# Patient Record
Sex: FEMALE | Race: ASIAN | NOT HISPANIC OR LATINO | Employment: FULL TIME | URBAN - METROPOLITAN AREA
[De-identification: names, ages, dates, MRNs, and addresses within clinical notes are randomized per-mention and may not be internally consistent; named-entity substitution may affect disease eponyms.]

---

## 2017-09-05 ENCOUNTER — OFFICE VISIT (OUTPATIENT)
Dept: INTERNAL MEDICINE | Facility: MEDICAL CENTER | Age: 47
End: 2017-09-05
Payer: COMMERCIAL

## 2017-09-05 VITALS
BODY MASS INDEX: 28.48 KG/M2 | DIASTOLIC BLOOD PRESSURE: 86 MMHG | HEIGHT: 59 IN | OXYGEN SATURATION: 98 % | SYSTOLIC BLOOD PRESSURE: 124 MMHG | WEIGHT: 141.25 LBS | TEMPERATURE: 97.9 F | HEART RATE: 72 BPM

## 2017-09-05 DIAGNOSIS — R63.5 WEIGHT GAIN: ICD-10-CM

## 2017-09-05 DIAGNOSIS — R53.83 FATIGUE, UNSPECIFIED TYPE: ICD-10-CM

## 2017-09-05 DIAGNOSIS — E66.3 OVERWEIGHT (BMI 25.0-29.9): ICD-10-CM

## 2017-09-05 DIAGNOSIS — N92.6 MISSED PERIOD: ICD-10-CM

## 2017-09-05 DIAGNOSIS — Z87.59 HISTORY OF MISCARRIAGE: ICD-10-CM

## 2017-09-05 DIAGNOSIS — R14.0 BLOATING: ICD-10-CM

## 2017-09-05 LAB
INT CON NEG: NEGATIVE
INT CON POS: POSITIVE
POC URINE PREGNANCY TEST: NEGATIVE

## 2017-09-05 PROCEDURE — 81025 URINE PREGNANCY TEST: CPT | Performed by: INTERNAL MEDICINE

## 2017-09-05 PROCEDURE — 99204 OFFICE O/P NEW MOD 45 MIN: CPT | Mod: GC | Performed by: INTERNAL MEDICINE

## 2017-09-05 ASSESSMENT — PATIENT HEALTH QUESTIONNAIRE - PHQ9: CLINICAL INTERPRETATION OF PHQ2 SCORE: 0

## 2017-09-05 NOTE — PATIENT INSTRUCTIONS
Menopause  Menopause is the normal time of life when menstrual periods stop completely. Menopause is complete when you have missed 12 consecutive menstrual periods. It usually occurs between the ages of 48 years and 55 years. Very rarely does a woman develop menopause before the age of 40 years. At menopause, your ovaries stop producing the female hormones estrogen and progesterone. This can cause undesirable symptoms and also affect your health. Sometimes the symptoms may occur 4-5 years before the menopause begins. There is no relationship between menopause and:  · Oral contraceptives.  · Number of children you had.  · Race.  · The age your menstrual periods started (menarche).  Heavy smokers and very thin women may develop menopause earlier in life.  CAUSES  · The ovaries stop producing the female hormones estrogen and progesterone.  · Other causes include:  ¨ Surgery to remove both ovaries.  ¨ The ovaries stop functioning for no known reason.  ¨ Tumors of the pituitary gland in the brain.  ¨ Medical disease that affects the ovaries and hormone production.  ¨ Radiation treatment to the abdomen or pelvis.  ¨ Chemotherapy that affects the ovaries.  SYMPTOMS   · Hot flashes.  · Night sweats.  · Decrease in sex drive.  · Vaginal dryness and thinning of the vagina causing painful intercourse.  · Dryness of the skin and developing wrinkles.  · Headaches.  · Tiredness.  · Irritability.  · Memory problems.  · Weight gain.  · Bladder infections.  · Hair growth of the face and chest.  · Infertility.  More serious symptoms include:  · Loss of bone (osteoporosis) causing breaks (fractures).  · Depression.  · Hardening and narrowing of the arteries (atherosclerosis) causing heart attacks and strokes.  DIAGNOSIS   · When the menstrual periods have stopped for 12 straight months.  · Physical exam.  · Hormone studies of the blood.  TREATMENT   There are many treatment choices and nearly as many questions about them. The  decisions to treat or not to treat menopausal changes is an individual choice made with your health care provider. Your health care provider can discuss the treatments with you. Together, you can decide which treatment will work best for you. Your treatment choices may include:   · Hormone therapy (estrogen and progesterone).  · Non-hormonal medicines.  · Treating the individual symptoms with medicine (for example antidepressants for depression).  · Herbal medicines that may help specific symptoms.  · Counseling by a psychiatrist or psychologist.  · Group therapy.  · Lifestyle changes including:  ¨ Eating healthy.  ¨ Regular exercise.  ¨ Limiting caffeine and alcohol.  ¨ Stress management and meditation.  · No treatment.  HOME CARE INSTRUCTIONS   · Take the medicine your health care provider gives you as directed.  · Get plenty of sleep and rest.  · Exercise regularly.  · Eat a diet that contains calcium (good for the bones) and soy products (acts like estrogen hormone).  · Avoid alcoholic beverages.  · Do not smoke.  · If you have hot flashes, dress in layers.  · Take supplements, calcium, and vitamin D to strengthen bones.  · You can use over-the-counter lubricants or moisturizers for vaginal dryness.  · Group therapy is sometimes very helpful.  · Acupuncture may be helpful in some cases.  SEEK MEDICAL CARE IF:   · You are not sure you are in menopause.  · You are having menopausal symptoms and need advice and treatment.  · You are still having menstrual periods after age 55 years.  · You have pain with intercourse.  · Menopause is complete (no menstrual period for 12 months) and you develop vaginal bleeding.  · You need a referral to a specialist (gynecologist, psychiatrist, or psychologist) for treatment.  SEEK IMMEDIATE MEDICAL CARE IF:   · You have severe depression.  · You have excessive vaginal bleeding.  · You fell and think you have a broken bone.  · You have pain when you urinate.  · You develop leg or  chest pain.  · You have a fast pounding heart beat (palpitations).  · You have severe headaches.  · You develop vision problems.  · You feel a lump in your breast.  · You have abdominal pain or severe indigestion.     This information is not intended to replace advice given to you by your health care provider. Make sure you discuss any questions you have with your health care provider.     Document Released: 03/09/2005 Document Revised: 08/20/2014 Document Reviewed: 07/17/2014  [employer] Interactive Patient Education ©2016 [employer]  Bloating  Bloating is the feeling of fullness in your belly. You may feel as though your pants are too tight. Often the cause of bloating is overeating, retaining fluids, or having gas in your bowel. It is also caused by swallowing air and eating foods that cause gas. Irritable bowel syndrome is one of the most common causes of bloating. Constipation is also a common cause. Sometimes more serious problems can cause bloating.  SYMPTOMS   Usually there is a feeling of fullness, as though your abdomen is bulged out. There may be mild discomfort.   DIAGNOSIS   Usually no particular testing is necessary for most bloating. If the condition persists and seems to become worse, your caregiver may do additional testing.   TREATMENT   · There is no direct treatment for bloating.   · Do not put gas into the bowel. Avoid chewing gum and sucking on candy. These tend to make you swallow air. Swallowing air can also be a nervous habit. Try to avoid this.   · Avoiding high residue diets will help. Eat foods with soluble fibers (examples include root vegetables, apples, or barley) and substitute dairy products with soy and rice products. This helps irritable bowel syndrome.   · If constipation is the cause, then a high residue diet with more fiber will help.   · Avoid carbonated beverages.   · Over-the-counter preparations are available that help reduce gas. Your pharmacist can help you with this.    SEEK MEDICAL CARE IF:   · Bloating continues and seems to be getting worse.   · You notice a weight gain.   · You have a weight loss but the bloating is getting worse.   · You have changes in your bowel habits or develop nausea or vomiting.   SEEK IMMEDIATE MEDICAL CARE IF:   · You develop shortness of breath or swelling in your legs.   · You have an increase in abdominal pain or develop chest pain.   Document Released: 10/18/2007 Document Revised: 03/11/2013 Document Reviewed: 12/05/2008  [employer]® Patient Information ©2013 [employer].Fatigue  Fatigue is feeling tired all of the time, a lack of energy, or a lack of motivation. Occasional or mild fatigue is often a normal response to activity or life in general. However, long-lasting (chronic) or extreme fatigue may indicate an underlying medical condition.  HOME CARE INSTRUCTIONS   Watch your fatigue for any changes. The following actions may help to lessen any discomfort you are feeling:  · Talk to your health care provider about how much sleep you need each night. Try to get the required amount every night.  · Take medicines only as directed by your health care provider.  · Eat a healthy and nutritious diet. Ask your health care provider if you need help changing your diet.  · Drink enough fluid to keep your urine clear or pale yellow.  · Practice ways of relaxing, such as yoga, meditation, massage therapy, or acupuncture.  · Exercise regularly.    · Change situations that cause you stress. Try to keep your work and personal routine reasonable.  · Do not abuse illegal drugs.  · Limit alcohol intake to no more than 1 drink per day for nonpregnant women and 2 drinks per day for men. One drink equals 12 ounces of beer, 5 ounces of wine, or 1½ ounces of hard liquor.  · Take a multivitamin, if directed by your health care provider.  SEEK MEDICAL CARE IF:   · Your fatigue does not get better.  · You have a fever.    · You have unintentional weight loss or  gain.  · You have headaches.    · You have difficulty:    ¨ Falling asleep.  ¨ Sleeping throughout the night.  · You feel angry, guilty, anxious, or sad.     · You are unable to have a bowel movement (constipation).    · You skin is dry.     · Your legs or another part of your body is swollen.    SEEK IMMEDIATE MEDICAL CARE IF:   · You feel confused.    · Your vision is blurry.  · You feel faint or pass out.    · You have a severe headache.    · You have severe abdominal, pelvic, or back pain.    · You have chest pain, shortness of breath, or an irregular or fast heartbeat.    · You are unable to urinate or you urinate less than normal.    · You develop abnormal bleeding, such as bleeding from the rectum, vagina, nose, lungs, or nipples.  · You vomit blood.     · You have thoughts about harming yourself or committing suicide.    · You are worried that you might harm someone else.       This information is not intended to replace advice given to you by your health care provider. Make sure you discuss any questions you have with your health care provider.     Document Released: 10/14/2008 Document Revised: 01/08/2016 Document Reviewed: 04/21/2015  [employer] Interactive Patient Education ©2016 [employer]

## 2017-09-05 NOTE — PROGRESS NOTES
New Patient to Establish    Reason to establish: new symptoms    CC: missed period    HPI: 47 yr old female patient, Ms Kisha Mendoza with PMHx of spontaneous  about 10 yrs ago comes in for evaluation of ongoing new symptoms-missed period,fatigue,bloating.  As per patient she missed period last month and since then she feels tired, bloated especially when she eats carbohydrates and prefers drinking juices.  Also reports of gaining weight almost near to 10 pounds, does not specify the duration.  Patient states eating carbohydrates makes her abdomen bloated and she is trying to cut down on carbohydrates in her diet and incorporating fruits and juices.  She has been feeling tired for the past few weeks associated with intermittent feeling of being hot.  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 She had miscarriage in the past in  when she tried to lift a patient during her work.  She never got pregnant since then.  for 13 years now.    No fever/chills/cough/SOB/chest pain/palpitations/vomiting/abdominal pain/diarrhea/constipation/heart burn/weakness/headache.      Patient Active Problem List    Diagnosis Date Noted   • Bloating 2017   • History of miscarriage 2017   • Missed period 2017       No past medical history on file.    Current Outpatient Prescriptions   Medication Sig Dispense Refill   • Prenatal Vit-Fe Fumarate-FA (PRENATAL PO) Take  by mouth.     • FOLIC ACID PO Take  by mouth.     • Ergocalciferol (VITAMIN D2 PO) Take  by mouth.     • Cyanocobalamin (VITAMIN B-12 PO) Take  by mouth.     • Pyridoxine HCl (VITAMIN B6 PO) Take  by mouth.       No current facility-administered medications for this visit.        Allergies as of 2017 - Reviewed 2017   Allergen Reaction Noted   • Ibuprofen Unspecified 2017       Social History     Social History   • Marital status:      Spouse name: N/A   • Number of children: N/A   • Years of education: N/A     Occupational  "History   • Not on file.     Social History Main Topics   • Smoking status: Former Smoker     Years: 3.00     Types: Cigarettes     Quit date: 1/1/1990   • Smokeless tobacco: Never Used   • Alcohol use No   • Drug use: No   • Sexual activity: Yes     Partners: Male     Other Topics Concern   • Not on file     Social History Narrative   • No narrative on file       History reviewed. No pertinent family history.    No past surgical history on file.    ROS: As per HPI. Additional pertinent symptoms as noted below.    Constitutional: Denies fever/chills. Positive for weight gain and fatigue  Eyes: Denies changes/pain in vision  ENT: Denies sore throat, congestion, ear pain  Cardiovascular: Denies chest pain /Palpitations/Edema  Respiratory: Denies SOB, cough  Abdomen: Denies abdominal pain/difficulty swallowing/ diarrhea/constipation. Positive for bloating  Genitourinary: Normal urinary habits  Musculo-skeletal: Denies joint/muscle pain  Skin: Denies rash/lesions.   Neurological: Denies weakness/tingling/numbness  Psychological: Normal mood, Cooperative.        /86   Pulse 72   Temp 36.6 °C (97.9 °F)   Ht 1.499 m (4' 11\")   Wt 64.1 kg (141 lb 4 oz)   SpO2 98%   BMI 28.53 kg/m²     Physical Exam  General:  Alert and oriented, No apparent distress.    Eyes: Pupils equal and reactive. No scleral icterus.    Throat: Clear no erythema or exudates noted.    Neck: Supple. No lymphadenopathy noted. Thyroid not enlarged.    Lungs: Clear to auscultation and percussion bilaterally.    Cardiovascular: Regular rate and rhythm. No murmurs, rubs or gallops.    Abdomen:  Mild distention. No rebound or guarding noted. No organomegaly.    Extremities: No clubbing, cyanosis, edema.    Skin: Clear. No rash or suspicious skin lesions noted.    Neurological: Oriented to time, place, and person .Cranial nerves intact. No motor or sensory deficits.Reflexes were normal and symmetrical in both upper and lower " extremities     Musculoskeletal : NROM of all extremities. No spinal/vertebral tenderness.         Assessment and Plan    1. Missed period  - likely due to menopause  - ordered clinic- urine pregnancy test which is negative  - counseled and educated patient about menopause and will follow up in 4 weeks.  - also counseled patient about possible complications of becoming pregnant.    2. Bloating  - likely due to ongoing menopause or underlying differential diagnoses-hypothyroidism, malabsorption  - ordered CBC,CMP,lipid profile, TSH with reflex T4    3. Fatigue, unspecified type  - likely due to ongoing menopause or underlying differential diagnoses-hypothyroidism, malabsorption, vitamin D deficiency  - ordered CBC,CMP,lipid profile,TSH with reflex T4, Vitamin D deficiency  - will follow up in 4 weeks    4. Weight gain  - likely etiology multiple differentials  - BMI 28.53  - will follow up in 4 weeks with all the labs ordered  - encouraged to limit carbohydrates and fats in the diet and exercise regularly    5. History of miscarriage  - had one miscarriage in 2007 and never been pregnant since then  - ordered clinic -urine pregnancy test which was negative given her last missed period  - patient is  for last 13 years, currently sexually active with her  with no protection.  - also counseled patient about possible complications of becoming pregnant.    6.Overweight   - her BMI today is 28.53  - encouraged exercise and limit carbohydrates and fats in her diet  - will continue to follow.    Risk Assessment (discuss potential complications a function of chronic problems): spent 35 min's addressing patient's concerns and possible plans of management.    Complexity (discuss number of co-morbidities): menopause    Signed by: Gildardo Babb M.D.

## 2017-09-06 ENCOUNTER — HOSPITAL ENCOUNTER (OUTPATIENT)
Dept: LAB | Facility: MEDICAL CENTER | Age: 47
End: 2017-09-06
Attending: INTERNAL MEDICINE
Payer: COMMERCIAL

## 2017-09-06 DIAGNOSIS — R63.5 WEIGHT GAIN: ICD-10-CM

## 2017-09-06 DIAGNOSIS — R14.0 BLOATING: ICD-10-CM

## 2017-09-06 DIAGNOSIS — R53.83 FATIGUE, UNSPECIFIED TYPE: ICD-10-CM

## 2017-09-06 DIAGNOSIS — N92.6 MISSED PERIOD: ICD-10-CM

## 2017-09-06 LAB
25(OH)D3 SERPL-MCNC: 33 NG/ML (ref 30–100)
ALBUMIN SERPL BCP-MCNC: 4 G/DL (ref 3.2–4.9)
ALBUMIN/GLOB SERPL: 1.3 G/DL
ALP SERPL-CCNC: 48 U/L (ref 30–99)
ALT SERPL-CCNC: 36 U/L (ref 2–50)
ANION GAP SERPL CALC-SCNC: 8 MMOL/L (ref 0–11.9)
AST SERPL-CCNC: 20 U/L (ref 12–45)
BASOPHILS # BLD AUTO: 1.1 % (ref 0–1.8)
BASOPHILS # BLD: 0.09 K/UL (ref 0–0.12)
BILIRUB SERPL-MCNC: 0.5 MG/DL (ref 0.1–1.5)
BUN SERPL-MCNC: 12 MG/DL (ref 8–22)
CALCIUM SERPL-MCNC: 9.3 MG/DL (ref 8.5–10.5)
CHLORIDE SERPL-SCNC: 102 MMOL/L (ref 96–112)
CHOLEST SERPL-MCNC: 215 MG/DL (ref 100–199)
CO2 SERPL-SCNC: 25 MMOL/L (ref 20–33)
CREAT SERPL-MCNC: 0.72 MG/DL (ref 0.5–1.4)
EOSINOPHIL # BLD AUTO: 0.28 K/UL (ref 0–0.51)
EOSINOPHIL NFR BLD: 3.3 % (ref 0–6.9)
ERYTHROCYTE [DISTWIDTH] IN BLOOD BY AUTOMATED COUNT: 42.2 FL (ref 35.9–50)
GFR SERPL CREATININE-BSD FRML MDRD: >60 ML/MIN/1.73 M 2
GLOBULIN SER CALC-MCNC: 3.1 G/DL (ref 1.9–3.5)
GLUCOSE SERPL-MCNC: 111 MG/DL (ref 65–99)
HCT VFR BLD AUTO: 44.4 % (ref 37–47)
HDLC SERPL-MCNC: 37 MG/DL
HGB BLD-MCNC: 14.5 G/DL (ref 12–16)
IMM GRANULOCYTES # BLD AUTO: 0.03 K/UL (ref 0–0.11)
IMM GRANULOCYTES NFR BLD AUTO: 0.4 % (ref 0–0.9)
LDLC SERPL CALC-MCNC: 99 MG/DL
LYMPHOCYTES # BLD AUTO: 2.96 K/UL (ref 1–4.8)
LYMPHOCYTES NFR BLD: 35.4 % (ref 22–41)
MCH RBC QN AUTO: 29 PG (ref 27–33)
MCHC RBC AUTO-ENTMCNC: 32.7 G/DL (ref 33.6–35)
MCV RBC AUTO: 88.8 FL (ref 81.4–97.8)
MONOCYTES # BLD AUTO: 0.56 K/UL (ref 0–0.85)
MONOCYTES NFR BLD AUTO: 6.7 % (ref 0–13.4)
NEUTROPHILS # BLD AUTO: 4.45 K/UL (ref 2–7.15)
NEUTROPHILS NFR BLD: 53.1 % (ref 44–72)
NRBC # BLD AUTO: 0 K/UL
NRBC BLD AUTO-RTO: 0 /100 WBC
PLATELET # BLD AUTO: 290 K/UL (ref 164–446)
PMV BLD AUTO: 10.1 FL (ref 9–12.9)
POTASSIUM SERPL-SCNC: 3.8 MMOL/L (ref 3.6–5.5)
PROT SERPL-MCNC: 7.1 G/DL (ref 6–8.2)
RBC # BLD AUTO: 5 M/UL (ref 4.2–5.4)
SODIUM SERPL-SCNC: 135 MMOL/L (ref 135–145)
TRIGL SERPL-MCNC: 397 MG/DL (ref 0–149)
TSH SERPL DL<=0.005 MIU/L-ACNC: 1.96 UIU/ML (ref 0.3–3.7)
WBC # BLD AUTO: 8.4 K/UL (ref 4.8–10.8)

## 2017-09-06 PROCEDURE — 36415 COLL VENOUS BLD VENIPUNCTURE: CPT

## 2017-09-06 PROCEDURE — 80053 COMPREHEN METABOLIC PANEL: CPT

## 2017-09-06 PROCEDURE — 82306 VITAMIN D 25 HYDROXY: CPT

## 2017-09-06 PROCEDURE — 80061 LIPID PANEL: CPT

## 2017-09-06 PROCEDURE — 85025 COMPLETE CBC W/AUTO DIFF WBC: CPT

## 2017-09-06 PROCEDURE — 84443 ASSAY THYROID STIM HORMONE: CPT

## 2017-09-08 ENCOUNTER — TELEPHONE (OUTPATIENT)
Dept: INTERNAL MEDICINE | Facility: MEDICAL CENTER | Age: 47
End: 2017-09-08

## 2017-09-08 ENCOUNTER — OFFICE VISIT (OUTPATIENT)
Dept: URGENT CARE | Facility: CLINIC | Age: 47
End: 2017-09-08
Payer: COMMERCIAL

## 2017-09-08 VITALS
OXYGEN SATURATION: 97 % | BODY MASS INDEX: 28.43 KG/M2 | HEIGHT: 59 IN | RESPIRATION RATE: 16 BRPM | DIASTOLIC BLOOD PRESSURE: 72 MMHG | WEIGHT: 141 LBS | SYSTOLIC BLOOD PRESSURE: 118 MMHG | HEART RATE: 72 BPM | TEMPERATURE: 97.6 F

## 2017-09-08 DIAGNOSIS — N92.6 IRREGULAR MENSES: ICD-10-CM

## 2017-09-08 PROCEDURE — 99203 OFFICE O/P NEW LOW 30 MIN: CPT | Performed by: NURSE PRACTITIONER

## 2017-09-08 ASSESSMENT — ENCOUNTER SYMPTOMS
ABDOMINAL PAIN: 0
NAUSEA: 1
DIARRHEA: 0
FEVER: 0
CONSTIPATION: 0

## 2017-09-08 NOTE — TELEPHONE ENCOUNTER
1. Caller Name: Patient                                         Call Back Number: [phone] (home)       Patient approves a detailed voicemail message: N\A    Patient LVM requesting interpretation of lab results, please advise.

## 2017-09-08 NOTE — PROGRESS NOTES
Subjective:      Kisha Mendoza is a 47 y.o. female who presents with Other (pt missed period, she feels bloating, nausea )            Menstrual Problem   This is a new problem. Episode onset: Pt states she missed her period last month which is very unusual. She established with a new PCP 3 days ago and discussed her concerns with the potential of being pregnant as well as her other symptoms of bloating and nausea. The problem occurs constantly. The problem has been unchanged. Associated symptoms include nausea. Pertinent negatives include no abdominal pain, constipation, diarrhea or fever. Associated symptoms comments: Bloating, distention. She states she has regular bowel movements and this has not been an issue for her. She reports that all these new symptoms began when she first missed her period last month. Nothing aggravates the symptoms. She is sexually active. No, her partner does not have an STD. She uses nothing for contraception. Her menstrual history has been regular. Her past medical history is significant for miscarriage (2007).       Review of Systems   Constitutional: Negative for fever.   Gastrointestinal: Positive for nausea. Negative for abdominal pain, constipation and diarrhea.   Genitourinary: Positive for menstrual problem.   All other systems reviewed and are negative.    No past medical history on file. No past surgical history on file.   Social History     Social History   • Marital status:      Spouse name: N/A   • Number of children: N/A   • Years of education: N/A     Occupational History   • Not on file.     Social History Main Topics   • Smoking status: Former Smoker     Years: 3.00     Types: Cigarettes     Quit date: 1/1/1990   • Smokeless tobacco: Never Used   • Alcohol use No   • Drug use: No   • Sexual activity: Yes     Partners: Male     Other Topics Concern   • Not on file     Social History Narrative   • No narrative on file          Objective:     /72   Pulse 72    "Temp 36.4 °C (97.6 °F)   Resp 16   Ht 1.499 m (4' 11\")   Wt 64 kg (141 lb)   SpO2 97%   BMI 28.48 kg/m²      Physical Exam   Constitutional: She is oriented to person, place, and time. Vital signs are normal. She appears well-developed and well-nourished.   HENT:   Head: Normocephalic and atraumatic.   Eyes: EOM are normal. Pupils are equal, round, and reactive to light.   Neck: Normal range of motion.   Cardiovascular: Normal rate and regular rhythm.    Pulmonary/Chest: Effort normal.   Abdominal: Normal appearance and bowel sounds are normal. She exhibits distension. There is no CVA tenderness.   Musculoskeletal: Normal range of motion.   Neurological: She is alert and oriented to person, place, and time.   Skin: Skin is warm and dry. Capillary refill takes less than 2 seconds.   Psychiatric: She has a normal mood and affect. Her speech is normal and behavior is normal. Thought content normal.   Vitals reviewed.              Assessment/Plan:     1. Irregular menses  - HCG QUAL SERUM; Future    She had routine labs performed with her PCP but a HCG was not ordered. She would like this done today.   Pt walked to lab for blood draw      **Appears patient never her had blood collected. No HCG results in computer  "

## 2017-09-11 ENCOUNTER — TELEPHONE (OUTPATIENT)
Dept: URGENT CARE | Facility: CLINIC | Age: 47
End: 2017-09-11

## 2017-09-11 NOTE — TELEPHONE ENCOUNTER
Called patient on the phone number on file, she did not answer the call and left voicemail.  Will try to reach the patient again this afternoon. Please fax/mail/request to sign up [employer] to access results.    Thanks

## 2017-11-04 ENCOUNTER — APPOINTMENT (OUTPATIENT)
Dept: RADIOLOGY | Facility: MEDICAL CENTER | Age: 47
End: 2017-11-04
Attending: FAMILY MEDICINE
Payer: COMMERCIAL

## 2017-11-06 ENCOUNTER — HOSPITAL ENCOUNTER (OUTPATIENT)
Dept: RADIOLOGY | Facility: MEDICAL CENTER | Age: 47
End: 2017-11-06
Attending: FAMILY MEDICINE
Payer: COMMERCIAL

## 2017-11-06 DIAGNOSIS — R14.0 ABDOMINAL DISTENTION: ICD-10-CM

## 2017-11-06 PROCEDURE — 76700 US EXAM ABDOM COMPLETE: CPT

## 2018-02-21 ENCOUNTER — GYNECOLOGY VISIT (OUTPATIENT)
Dept: OBGYN | Facility: MEDICAL CENTER | Age: 48
End: 2018-02-21
Payer: COMMERCIAL

## 2018-02-21 VITALS
DIASTOLIC BLOOD PRESSURE: 80 MMHG | WEIGHT: 139 LBS | BODY MASS INDEX: 28.02 KG/M2 | HEIGHT: 59 IN | SYSTOLIC BLOOD PRESSURE: 110 MMHG

## 2018-02-21 DIAGNOSIS — N93.9 ABNORMAL UTERINE BLEEDING (AUB): ICD-10-CM

## 2018-02-21 DIAGNOSIS — N95.1 PERIMENOPAUSE: ICD-10-CM

## 2018-02-21 DIAGNOSIS — R10.2 PELVIC PAIN: ICD-10-CM

## 2018-02-21 PROCEDURE — 99213 OFFICE O/P EST LOW 20 MIN: CPT | Performed by: OBSTETRICS & GYNECOLOGY

## 2018-02-22 NOTE — PROGRESS NOTES
"Chief Complaint   Patient presents with   • Other     Discuss ultrasound done at Dr Lynn's office.        History of present illness:   47 y.o. Presents today for 2nd opinion  She reports that she did not have periods x 7 months (since July) then had menses lasting 7 days this month (feb)  Her period in February was accompanied by menstrual cramps  She went to see a gyn who performed an EMB in January (as per pt it is benign) and TVS was performed that showed fibroids= # 2; biggest was 2.8 cm    Pt was recommended a hysterectomy  Pt is concerned that the fibroid might turn into cancer, her 2 sisters had fibroids also where 1 had a hysterectomy in the [facility].    - had 1 SAB in the past, did want to have a baby, did not undergo infertility tx     Review of systems:  Pertinent positives documented in HPI and all other systems reviewed & are negative    All PMH, PSH, allergies, social history and FH reviewed and updated today:  History reviewed. No pertinent past medical history.    History reviewed. No pertinent surgical history.    Allergies:   Allergies   Allergen Reactions   • Ibuprofen Unspecified     Stomach ache       Social History     Social History   • Marital status:      Spouse name: N/A   • Number of children: N/A   • Years of education: N/A     Occupational History   • Not on file.     Social History Main Topics   • Smoking status: Former Smoker     Years: 3.00     Types: Cigarettes     Quit date: 1990   • Smokeless tobacco: Never Used   • Alcohol use No   • Drug use: No   • Sexual activity: Yes     Partners: Male     Other Topics Concern   • Not on file     Social History Narrative   • No narrative on file       History reviewed. No pertinent family history.    Physical exam:  Blood pressure 110/80, height 1.499 m (4' 11\"), weight 63 kg (139 lb), last menstrual period 2018.    General:appears stated age, is in no apparent distress, is well developed and well nourished  Head: " normocephalic, non-tender  Neck: neck is supple  CV : regular rate and rhythm, no peripheral edema  Lungs: Normal respiratory effort. Clear to auscultation bilaterally  Abdomen: Bowel sounds positive, nondistended, soft, nontender x4, no rebound or guarding. No organomegaly. No masses.  Female GYN:SSE- normal cervix and vaginal walls    Skin: No rashes, or ulcers or lesions seen  Psychiatric: Patient shows appropriate affect, is alert and oriented x3, intact judgment and insight.    1. Perimenopause  US-GYN-PELVIS TRANSVAGINAL    REFERRAL TO FAMILY PRACTICE   2. Abnormal uterine bleeding (AUB)  US-GYN-PELVIS TRANSVAGINAL   3. Pelvic pain  US-GYN-PELVIS TRANSVAGINAL   4.      Fibroids discussed with pt. Will obtain baseline US, will monitor  5.      Obtain gyn records; annual mammo  6.      Ff-up with results  7.      All questions addressed  8.      Spent  15 minutes in face-to-face patient contact in which greater than 50% of that visit was spent in counseling/coordination of care

## 2018-02-28 ENCOUNTER — APPOINTMENT (OUTPATIENT)
Dept: RADIOLOGY | Facility: MEDICAL CENTER | Age: 48
End: 2018-02-28
Attending: OBSTETRICS & GYNECOLOGY
Payer: COMMERCIAL

## 2018-03-02 ENCOUNTER — HOSPITAL ENCOUNTER (OUTPATIENT)
Dept: RADIOLOGY | Facility: MEDICAL CENTER | Age: 48
End: 2018-03-02
Attending: OBSTETRICS & GYNECOLOGY
Payer: COMMERCIAL

## 2018-03-02 DIAGNOSIS — R10.2 PELVIC PAIN: ICD-10-CM

## 2018-03-02 DIAGNOSIS — N95.1 PERIMENOPAUSE: ICD-10-CM

## 2018-03-02 DIAGNOSIS — N93.9 ABNORMAL UTERINE BLEEDING (AUB): ICD-10-CM

## 2018-03-02 PROCEDURE — 76830 TRANSVAGINAL US NON-OB: CPT

## 2018-03-10 ENCOUNTER — APPOINTMENT (OUTPATIENT)
Dept: RADIOLOGY | Facility: MEDICAL CENTER | Age: 48
End: 2018-03-10
Attending: OBSTETRICS & GYNECOLOGY
Payer: COMMERCIAL

## 2018-03-19 ENCOUNTER — TELEPHONE (OUTPATIENT)
Dept: OBGYN | Facility: MEDICAL CENTER | Age: 48
End: 2018-03-19

## 2018-03-19 NOTE — TELEPHONE ENCOUNTER
Called and left a message for the patient to call me back or go to Urgent care as the doctor is not in and our provider that is here does not have any availability. Left message via voicemail.

## 2018-03-19 NOTE — TELEPHONE ENCOUNTER
----- Message from Jt Morgan sent at 3/19/2018  8:33 AM PDT -----  Contact: [phone]  Pt is having weird abdominal pain and wants to know if she needs to go to ER or urgent care.

## 2018-04-04 ENCOUNTER — OFFICE VISIT (OUTPATIENT)
Dept: URGENT CARE | Facility: PHYSICIAN GROUP | Age: 48
End: 2018-04-04
Payer: COMMERCIAL

## 2018-04-04 ENCOUNTER — HOSPITAL ENCOUNTER (OUTPATIENT)
Dept: RADIOLOGY | Facility: MEDICAL CENTER | Age: 48
End: 2018-04-04
Attending: NURSE PRACTITIONER
Payer: COMMERCIAL

## 2018-04-04 VITALS
RESPIRATION RATE: 14 BRPM | DIASTOLIC BLOOD PRESSURE: 86 MMHG | TEMPERATURE: 98.2 F | WEIGHT: 139 LBS | SYSTOLIC BLOOD PRESSURE: 124 MMHG | OXYGEN SATURATION: 100 % | HEART RATE: 82 BPM | BODY MASS INDEX: 28.07 KG/M2

## 2018-04-04 DIAGNOSIS — R10.30 LOWER ABDOMINAL PAIN: ICD-10-CM

## 2018-04-04 LAB
APPEARANCE UR: CLEAR
BILIRUB UR STRIP-MCNC: NORMAL MG/DL
COLOR UR AUTO: NORMAL
GLUCOSE UR STRIP.AUTO-MCNC: NORMAL MG/DL
INT CON NEG: NEGATIVE
INT CON POS: POSITIVE
KETONES UR STRIP.AUTO-MCNC: NORMAL MG/DL
LEUKOCYTE ESTERASE UR QL STRIP.AUTO: NORMAL
NITRITE UR QL STRIP.AUTO: NORMAL
PH UR STRIP.AUTO: 6 [PH] (ref 5–8)
POC URINE PREGNANCY TEST: NEGATIVE
PROT UR QL STRIP: NORMAL MG/DL
RBC UR QL AUTO: NORMAL
SP GR UR STRIP.AUTO: 1.01
UROBILINOGEN UR STRIP-MCNC: NORMAL MG/DL

## 2018-04-04 PROCEDURE — 81025 URINE PREGNANCY TEST: CPT | Performed by: NURSE PRACTITIONER

## 2018-04-04 PROCEDURE — 81002 URINALYSIS NONAUTO W/O SCOPE: CPT | Performed by: NURSE PRACTITIONER

## 2018-04-04 PROCEDURE — 99213 OFFICE O/P EST LOW 20 MIN: CPT | Performed by: NURSE PRACTITIONER

## 2018-04-04 ASSESSMENT — ENCOUNTER SYMPTOMS
FLANK PAIN: 1
ORTHOPNEA: 0
NAUSEA: 0
DIARRHEA: 0
HEADACHES: 0
ABDOMINAL PAIN: 1
CONSTIPATION: 0
VOMITING: 0
CHILLS: 0
DIZZINESS: 0
FEVER: 0
BACK PAIN: 1

## 2018-04-04 ASSESSMENT — PAIN SCALES - GENERAL: PAINLEVEL: 10=SEVERE PAIN

## 2018-04-04 NOTE — PROGRESS NOTES
Subjective:      Kisha Mendoza is a 47 y.o. female who presents with Abdominal Pain (lower abdomen and lower back pain, swollen stomach, feeling movement in belly, )            HPI New problem. 47 year old female with abdominal pain, bloating and movement in her abdomen since this morning. She woke up to 10/10 pain in lower back moving around. This is not her first time with this complaint. She has ultrasound for similar issues last month that showed only small fibroid. She denies fever, chills, myalgia, urinary frequency, burning or urgency. She has no nausea or diarrhea, no constipation. She has not taken any medication for this. Also had similar complaint in Sept 2017. Has been seeing GYN for this but could not get in today.  Ibuprofen  Current Outpatient Prescriptions on File Prior to Visit   Medication Sig Dispense Refill   • Prenatal Vit-Fe Fumarate-FA (PRENATAL PO) Take  by mouth.     • FOLIC ACID PO Take  by mouth.     • Ergocalciferol (VITAMIN D2 PO) Take  by mouth.     • Cyanocobalamin (VITAMIN B-12 PO) Take  by mouth.     • Pyridoxine HCl (VITAMIN B6 PO) Take  by mouth.       No current facility-administered medications on file prior to visit.      Social History     Social History   • Marital status:      Spouse name: N/A   • Number of children: N/A   • Years of education: N/A     Occupational History   • Not on file.     Social History Main Topics   • Smoking status: Former Smoker     Years: 3.00     Types: Cigarettes     Quit date: 1/1/1990   • Smokeless tobacco: Never Used   • Alcohol use No   • Drug use: No   • Sexual activity: Yes     Partners: Male     Other Topics Concern   • Not on file     Social History Narrative   • No narrative on file     family history is not on file.      Review of Systems   Constitutional: Negative for chills, fever and malaise/fatigue.   Cardiovascular: Negative for chest pain and orthopnea.   Gastrointestinal: Positive for abdominal pain. Negative for  constipation, diarrhea, nausea and vomiting.   Genitourinary: Positive for flank pain. Negative for dysuria, frequency, hematuria and urgency.   Musculoskeletal: Positive for back pain.   Neurological: Negative for dizziness and headaches.          Objective:     /86   Pulse 82   Temp 36.8 °C (98.2 °F)   Resp 14   Wt 63 kg (139 lb)   SpO2 100%   BMI 28.07 kg/m²      Physical Exam   Constitutional: She is oriented to person, place, and time. She appears well-developed and well-nourished. No distress.   Cardiovascular: Normal rate, regular rhythm and normal heart sounds.    No murmur heard.  Pulmonary/Chest: Effort normal and breath sounds normal. No respiratory distress.   Abdominal: Soft. Bowel sounds are normal. She exhibits no distension. There is no tenderness. There is no CVA tenderness.   Musculoskeletal: Normal range of motion.   Moves all 4 extremities normally   Neurological: She is alert and oriented to person, place, and time.   Skin: Skin is warm and dry.   Psychiatric: She has a normal mood and affect. Her behavior is normal. Thought content normal.   Vitals reviewed.              Assessment/Plan:     1. Lower abdominal pain  POCT Urinalysis    POCT Pregnancy    CT-ABDOMEN-PELVIS WITH    HCG QUANTITATIVE     Urine and pregnancy negative.  CT abdomen/pelvis with.  Will call with results.

## 2018-04-09 ENCOUNTER — TELEPHONE (OUTPATIENT)
Dept: MEDICAL GROUP | Facility: PHYSICIAN GROUP | Age: 48
End: 2018-04-09

## 2018-04-09 NOTE — TELEPHONE ENCOUNTER
----- Message from CODIE Ortiz sent at 4/9/2018 11:58 AM PDT -----  Kisha  The HCG test results are back and are negative.  CODIE Ortiz

## 2018-04-25 ENCOUNTER — APPOINTMENT (OUTPATIENT)
Dept: OBGYN | Facility: MEDICAL CENTER | Age: 48
End: 2018-04-25
Payer: COMMERCIAL

## 2018-06-26 ENCOUNTER — SUPERVISING PHYSICIAN REVIEW (OUTPATIENT)
Dept: URGENT CARE | Facility: CLINIC | Age: 48
End: 2018-06-26

## 2018-11-21 ENCOUNTER — NON-PROVIDER VISIT (OUTPATIENT)
Dept: URGENT CARE | Facility: PHYSICIAN GROUP | Age: 48
End: 2018-11-21

## 2018-11-21 DIAGNOSIS — Z02.83 ENCOUNTER FOR DRUG SCREENING: ICD-10-CM

## 2018-11-21 PROCEDURE — 8898 PR URINE 11 PANEL - SEND TO LAB: Performed by: EMERGENCY MEDICINE

## 2018-11-27 ENCOUNTER — NON-PROVIDER VISIT (OUTPATIENT)
Dept: OCCUPATIONAL MEDICINE | Facility: CLINIC | Age: 48
End: 2018-11-27

## 2018-11-27 ENCOUNTER — HOSPITAL ENCOUNTER (OUTPATIENT)
Facility: MEDICAL CENTER | Age: 48
End: 2018-11-27
Attending: PREVENTIVE MEDICINE
Payer: COMMERCIAL

## 2018-11-27 DIAGNOSIS — Z02.89 VISIT FOR OCCUPATIONAL HEALTH EXAMINATION: ICD-10-CM

## 2018-11-27 PROCEDURE — 86735 MUMPS ANTIBODY: CPT | Performed by: PREVENTIVE MEDICINE

## 2018-11-27 PROCEDURE — 86765 RUBEOLA ANTIBODY: CPT | Performed by: PREVENTIVE MEDICINE

## 2018-11-27 PROCEDURE — 86580 TB INTRADERMAL TEST: CPT | Performed by: PREVENTIVE MEDICINE

## 2018-11-27 PROCEDURE — 86762 RUBELLA ANTIBODY: CPT | Performed by: PREVENTIVE MEDICINE

## 2018-11-27 PROCEDURE — 86706 HEP B SURFACE ANTIBODY: CPT | Performed by: PREVENTIVE MEDICINE

## 2018-11-27 PROCEDURE — 87340 HEPATITIS B SURFACE AG IA: CPT | Performed by: PREVENTIVE MEDICINE

## 2018-11-27 PROCEDURE — 86704 HEP B CORE ANTIBODY TOTAL: CPT | Performed by: PREVENTIVE MEDICINE

## 2018-11-28 LAB
HBV CORE AB SERPL QL IA: NEGATIVE
HBV SURFACE AB SERPL IA-ACNC: 4.09 MIU/ML (ref 0–10)
HBV SURFACE AG SER QL: NEGATIVE
MEV IGG SER IA-ACNC: 3.68
MUV IGG SER IA-ACNC: 3.58
RUBV AB SER QL: >500 IU/ML

## 2018-11-29 ENCOUNTER — NON-PROVIDER VISIT (OUTPATIENT)
Dept: OCCUPATIONAL MEDICINE | Facility: CLINIC | Age: 48
End: 2018-11-29

## 2018-11-29 LAB — TB WHEAL 3D P 5 TU DIAM: NORMAL MM

## 2018-12-04 ENCOUNTER — NON-PROVIDER VISIT (OUTPATIENT)
Dept: OCCUPATIONAL MEDICINE | Facility: CLINIC | Age: 48
End: 2018-12-04

## 2018-12-04 DIAGNOSIS — Z11.1 PPD SCREENING TEST: ICD-10-CM

## 2018-12-04 PROCEDURE — 86580 TB INTRADERMAL TEST: CPT | Performed by: PREVENTIVE MEDICINE

## 2018-12-07 ENCOUNTER — NON-PROVIDER VISIT (OUTPATIENT)
Dept: OCCUPATIONAL MEDICINE | Facility: CLINIC | Age: 48
End: 2018-12-07

## 2018-12-07 ENCOUNTER — OFFICE VISIT (OUTPATIENT)
Dept: OCCUPATIONAL MEDICINE | Facility: CLINIC | Age: 48
End: 2018-12-07

## 2018-12-07 VITALS
WEIGHT: 139 LBS | HEIGHT: 62 IN | BODY MASS INDEX: 25.58 KG/M2 | DIASTOLIC BLOOD PRESSURE: 76 MMHG | SYSTOLIC BLOOD PRESSURE: 112 MMHG | OXYGEN SATURATION: 98 % | TEMPERATURE: 97.4 F | HEART RATE: 71 BPM

## 2018-12-07 DIAGNOSIS — Z02.89 VISIT FOR OCCUPATIONAL HEALTH EXAMINATION: ICD-10-CM

## 2018-12-07 LAB — TB WHEAL 3D P 5 TU DIAM: NEGATIVE MM

## 2018-12-07 PROCEDURE — 8915 PR COMPREHENSIVE PHYSICAL: Performed by: PREVENTIVE MEDICINE

## 2020-05-30 ENCOUNTER — HOSPITAL ENCOUNTER (OUTPATIENT)
Facility: MEDICAL CENTER | Age: 50
End: 2020-05-30
Payer: COMMERCIAL

## 2020-06-03 LAB
SARS-COV-2 RNA SPEC QL NAA+PROBE: NOT DETECTED
SPECIMEN SOURCE: NORMAL

## 2020-07-02 ENCOUNTER — OFFICE VISIT (OUTPATIENT)
Dept: URGENT CARE | Facility: CLINIC | Age: 50
End: 2020-07-02
Payer: MEDICAID

## 2020-07-02 ENCOUNTER — TELEPHONE (OUTPATIENT)
Dept: URGENT CARE | Facility: CLINIC | Age: 50
End: 2020-07-02

## 2020-07-02 ENCOUNTER — HOSPITAL ENCOUNTER (OUTPATIENT)
Dept: RADIOLOGY | Facility: MEDICAL CENTER | Age: 50
End: 2020-07-02
Attending: NURSE PRACTITIONER
Payer: MEDICAID

## 2020-07-02 VITALS
RESPIRATION RATE: 14 BRPM | HEART RATE: 90 BPM | OXYGEN SATURATION: 96 % | HEIGHT: 61 IN | SYSTOLIC BLOOD PRESSURE: 132 MMHG | TEMPERATURE: 97.2 F | DIASTOLIC BLOOD PRESSURE: 80 MMHG | BODY MASS INDEX: 25.9 KG/M2 | WEIGHT: 137.2 LBS

## 2020-07-02 DIAGNOSIS — R10.9 ABDOMINAL DISCOMFORT: ICD-10-CM

## 2020-07-02 DIAGNOSIS — R14.0 ABDOMINAL DISTENTION: ICD-10-CM

## 2020-07-02 DIAGNOSIS — N91.2 AMENORRHEA: ICD-10-CM

## 2020-07-02 LAB
INT CON NEG: NORMAL
INT CON POS: NORMAL
POC URINE PREGNANCY TEST: NORMAL

## 2020-07-02 PROCEDURE — 76830 TRANSVAGINAL US NON-OB: CPT

## 2020-07-02 PROCEDURE — 81025 URINE PREGNANCY TEST: CPT | Performed by: NURSE PRACTITIONER

## 2020-07-02 PROCEDURE — 99214 OFFICE O/P EST MOD 30 MIN: CPT | Performed by: NURSE PRACTITIONER

## 2020-07-02 ASSESSMENT — ENCOUNTER SYMPTOMS
CONSTIPATION: 0
FEVER: 0
CONSTITUTIONAL NEGATIVE: 1
CHILLS: 0
ABDOMINAL PAIN: 1
NAUSEA: 0
CARDIOVASCULAR NEGATIVE: 1
RESPIRATORY NEGATIVE: 1
VOMITING: 0
SHORTNESS OF BREATH: 0
DIARRHEA: 0

## 2020-07-02 ASSESSMENT — VISUAL ACUITY: OU: 1

## 2020-07-02 NOTE — TELEPHONE ENCOUNTER
Phoned patient to discuss results of pelvic ultrasound.  No answer.  Voicemail left.    2 myometrial uterine fibroids present.  Otherwise, exam normal.  No pregnancy.

## 2020-07-02 NOTE — PROGRESS NOTES
"Subjective:     Kisha Mendoza is a 50 y.o. female who presents for Menopause (4 months w/o period// )       Menopause   The patient's primary symptoms include vaginal discharge. The patient's pertinent negatives include no genital itching or genital odor. This is a new problem. Associated symptoms include abdominal pain. Pertinent negatives include no chills, constipation, diarrhea, dysuria, fever, frequency, nausea, urgency or vomiting. She has tried nothing for the symptoms. There is no history of an abdominal surgery.     Patient presents to urgent care today with concerns that she may be pregnant.  Does not recall the last day of her last menstrual period, but reports it was sometime in the middle of May of this year.  Reports she last had sexual contact with her  at that time.  She reports that prior to this, her periods were regular, occurring about once every month.  She is also starting to notice white spots in her urine, possibly from vaginal discharge.  She reports the sensation that her abdomen is becoming more distended and even gets the feeling that something is \"moving\" in there.  This sensation worsens after eating.  She would like to have an ultrasound done before she goes back to the [facility].  Denies noticing other symptoms such as heat flashes, but reports she has been feeling more \"lazy.\"    Patient reports that several years ago, she was found to have myomas of her external uterus.    Patient was screened prior to rooming and denied COVID-19 diagnosis or contact with a person who has been diagnosed or is suspected to have COVID-19. During this visit, appropriate PPE was worn, hand hygiene was performed, and the patient and any visitors were masked.     PMH:  has no past medical history on file.    MEDS:   Current Outpatient Medications:   •  Prenatal Vit-Fe Fumarate-FA (PRENATAL PO), Take  by mouth., Disp: , Rfl:   •  FOLIC ACID PO, Take  by mouth., Disp: , Rfl:   •  " "Ergocalciferol (VITAMIN D2 PO), Take  by mouth., Disp: , Rfl:   •  Cyanocobalamin (VITAMIN B-12 PO), Take  by mouth., Disp: , Rfl:   •  Pyridoxine HCl (VITAMIN B6 PO), Take  by mouth., Disp: , Rfl:     ALLERGIES:   Allergies   Allergen Reactions   • Ibuprofen Unspecified     Stomach ache     SURGHX: History reviewed. No pertinent surgical history.    SOCHX:  reports that she quit smoking about 30 years ago. Her smoking use included cigarettes. She quit after 3.00 years of use. She has never used smokeless tobacco. She reports that she does not drink alcohol or use drugs.     FH: Reviewed with patient, not pertinent to this visit.    Review of Systems   Constitutional: Negative.  Negative for chills, fever and malaise/fatigue.   Respiratory: Negative.  Negative for shortness of breath.    Cardiovascular: Negative.  Negative for chest pain.   Gastrointestinal: Positive for abdominal pain. Negative for constipation, diarrhea, nausea and vomiting.   Genitourinary: Positive for vaginal discharge. Negative for dysuria, frequency and urgency.   All other systems reviewed and are negative.    Additional details per HPI.      Objective:     /80 (BP Location: Right arm, Patient Position: Sitting, BP Cuff Size: Adult long)   Pulse 90   Temp 36.2 °C (97.2 °F) (Temporal)   Resp 14   Ht 1.549 m (5' 1\")   Wt 62.2 kg (137 lb 3.2 oz)   SpO2 96%   BMI 25.92 kg/m²     Physical Exam  Vitals signs reviewed.   Constitutional:       General: She is not in acute distress.     Appearance: She is well-developed. She is not ill-appearing or toxic-appearing.   HENT:      Head: Normocephalic.      Right Ear: External ear normal.      Left Ear: External ear normal.      Nose: Nose normal.   Eyes:      General: Vision grossly intact.      Extraocular Movements: Extraocular movements intact.      Conjunctiva/sclera: Conjunctivae normal.   Neck:      Musculoskeletal: Normal range of motion.   Cardiovascular:      Rate and Rhythm: " Normal rate.   Pulmonary:      Effort: Pulmonary effort is normal. No respiratory distress.   Abdominal:      General: Bowel sounds are normal. There is no distension.      Palpations: Abdomen is soft.      Tenderness: There is no abdominal tenderness.   Musculoskeletal: Normal range of motion.         General: No deformity.   Skin:     General: Skin is warm and dry.      Coloration: Skin is not pale.   Neurological:      Mental Status: She is alert and oriented to person, place, and time.      Sensory: No sensory deficit.      Motor: No weakness.      Coordination: Coordination normal.   Psychiatric:         Behavior: Behavior normal. Behavior is cooperative.     POCT Pregnancy: negative      Assessment/Plan:     1. Amenorrhea  - POCT PREGNANCY    2. Abdominal discomfort  - US-PELVIC COMPLETE (TRANSABDOMINAL/TRANSVAGINAL) (COMBO); Future    3. Abdominal distention  - US-PELVIC COMPLETE (TRANSABDOMINAL/TRANSVAGINAL) (COMBO); Future      Urine pregnancy negative. Ultrasound pending to further evaluate symptoms.  Patient will be contacted with results.    Patient declines blood work at this time.    Differential diagnosis, natural history, supportive care, over-the-counter symptom management per 's instructions, close monitoring, and indications for immediate follow-up discussed.     Patient advised to: Return for 1) Symptoms that worsen/don't improve, or go to ER, 2) Follow up with primary care in 7-10 days.    All questions answered. Patient agrees with the plan of care.